# Patient Record
Sex: FEMALE
[De-identification: names, ages, dates, MRNs, and addresses within clinical notes are randomized per-mention and may not be internally consistent; named-entity substitution may affect disease eponyms.]

---

## 2024-05-20 ENCOUNTER — APPOINTMENT (OUTPATIENT)
Dept: ORTHOPEDIC SURGERY | Facility: CLINIC | Age: 74
End: 2024-05-20
Payer: MEDICARE

## 2024-05-20 DIAGNOSIS — M75.32 CALCIFIC TENDINITIS OF LEFT SHOULDER: ICD-10-CM

## 2024-05-20 PROBLEM — Z00.00 ENCOUNTER FOR PREVENTIVE HEALTH EXAMINATION: Status: ACTIVE | Noted: 2024-05-20

## 2024-05-20 PROCEDURE — 73010 X-RAY EXAM OF SHOULDER BLADE: CPT | Mod: LT

## 2024-05-20 PROCEDURE — 73030 X-RAY EXAM OF SHOULDER: CPT | Mod: LT

## 2024-05-20 PROCEDURE — 72040 X-RAY EXAM NECK SPINE 2-3 VW: CPT

## 2024-05-20 PROCEDURE — 99203 OFFICE O/P NEW LOW 30 MIN: CPT

## 2024-05-20 RX ORDER — METHYLPREDNISOLONE 4 MG/1
4 TABLET ORAL
Qty: 1 | Refills: 0 | Status: ACTIVE | COMMUNITY
Start: 2024-05-20 | End: 1900-01-01

## 2024-05-20 NOTE — ASSESSMENT
[FreeTextEntry1] : 75 yo F with sudden onset left shoulder pain this morning, no injury, no f/c/s  reviewed imaging - small calcific density over AC joint no concern for septic joint/cellulitis MDP rx, no NSAIDs until completing MDP, discussed with patient ice for relief pt lives [facility], discussed follow up in 1 week if symptoms persist rto prn

## 2024-05-20 NOTE — IMAGING
[de-identified] : left shoulder mild swelling over AC joint, no ecchymosis, no deformity, no scapular winging. TTP ac joint unable to test ROM/strength due to pain Motor and sensory intact distally  cervical No swelling, no ecchymosis mild left trap ttp Full range of motion with no pain 5/5 deltoid, biceps, triceps and wrist flexors/extensors Negative spurling, negative taylor Reflexes +2, intact motor and sensory distally [Facet arthropathy] : Facet arthropathy [Disc space narrowing] : Disc space narrowing [Left] : left shoulder [There are no fractures, subluxations or dislocations. No significant abnormalities are seen] : There are no fractures, subluxations or dislocations. No significant abnormalities are seen [Calcific density] : Calcific density

## 2024-05-20 NOTE — HISTORY OF PRESENT ILLNESS
[7] : 7 [5] : 5 [Dull/Aching] : dull/aching [Sharp] : sharp [Intermittent] : intermittent [Nothing helps with pain getting better] : Nothing helps with pain getting better [] : no [FreeTextEntry5] : Pain and limited ROM which started this morning, denies injury. unable to raise arm

## 2025-04-01 ENCOUNTER — NON-APPOINTMENT (OUTPATIENT)
Age: 75
End: 2025-04-01